# Patient Record
Sex: MALE | Race: BLACK OR AFRICAN AMERICAN | Employment: FULL TIME | URBAN - METROPOLITAN AREA
[De-identification: names, ages, dates, MRNs, and addresses within clinical notes are randomized per-mention and may not be internally consistent; named-entity substitution may affect disease eponyms.]

---

## 2020-01-28 ENCOUNTER — HOSPITAL ENCOUNTER (EMERGENCY)
Age: 45
Discharge: HOME | End: 2020-01-28
Attending: EMERGENCY MEDICINE | Admitting: EMERGENCY MEDICINE
Payer: COMMERCIAL

## 2020-01-28 ENCOUNTER — APPOINTMENT (OUTPATIENT)
Dept: CT IMAGING | Age: 45
End: 2020-01-28
Attending: PHYSICIAN ASSISTANT
Payer: COMMERCIAL

## 2020-01-28 VITALS
RESPIRATION RATE: 17 BRPM | TEMPERATURE: 98.8 F | DIASTOLIC BLOOD PRESSURE: 92 MMHG | SYSTOLIC BLOOD PRESSURE: 144 MMHG | WEIGHT: 230 LBS | HEIGHT: 70 IN | OXYGEN SATURATION: 100 % | BODY MASS INDEX: 32.93 KG/M2 | HEART RATE: 85 BPM

## 2020-01-28 DIAGNOSIS — H44.001 EYE INFECTION, RIGHT: Primary | ICD-10-CM

## 2020-01-28 DIAGNOSIS — H53.9 CHANGE IN VISION: ICD-10-CM

## 2020-01-28 LAB
ALBUMIN SERPL-MCNC: 4.1 G/DL (ref 3.4–5)
ALBUMIN/GLOB SERPL: 1.3 {RATIO} (ref 0.8–1.7)
ALP SERPL-CCNC: 60 U/L (ref 45–117)
ALT SERPL-CCNC: 40 U/L (ref 16–61)
ANION GAP SERPL CALC-SCNC: 4 MMOL/L (ref 3–18)
AST SERPL-CCNC: 30 U/L (ref 10–38)
BASOPHILS # BLD: 0 K/UL (ref 0–0.1)
BASOPHILS NFR BLD: 0 % (ref 0–2)
BILIRUB SERPL-MCNC: 0.5 MG/DL (ref 0.2–1)
BUN SERPL-MCNC: 18 MG/DL (ref 7–18)
BUN/CREAT SERPL: 16 (ref 12–20)
CALCIUM SERPL-MCNC: 9.1 MG/DL (ref 8.5–10.1)
CHLORIDE SERPL-SCNC: 107 MMOL/L (ref 100–111)
CO2 SERPL-SCNC: 29 MMOL/L (ref 21–32)
CREAT SERPL-MCNC: 1.12 MG/DL (ref 0.6–1.3)
DIFFERENTIAL METHOD BLD: ABNORMAL
EOSINOPHIL # BLD: 0.3 K/UL (ref 0–0.4)
EOSINOPHIL NFR BLD: 4 % (ref 0–5)
ERYTHROCYTE [DISTWIDTH] IN BLOOD BY AUTOMATED COUNT: 12.5 % (ref 11.6–14.5)
GLOBULIN SER CALC-MCNC: 3.2 G/DL (ref 2–4)
GLUCOSE SERPL-MCNC: 84 MG/DL (ref 74–99)
HCT VFR BLD AUTO: 41.7 % (ref 36–48)
HGB BLD-MCNC: 14.9 G/DL (ref 13–16)
LYMPHOCYTES # BLD: 3 K/UL (ref 0.9–3.6)
LYMPHOCYTES NFR BLD: 34 % (ref 21–52)
MAGNESIUM SERPL-MCNC: 2.3 MG/DL (ref 1.6–2.6)
MCH RBC QN AUTO: 33 PG (ref 24–34)
MCHC RBC AUTO-ENTMCNC: 35.7 G/DL (ref 31–37)
MCV RBC AUTO: 92.5 FL (ref 74–97)
MONOCYTES # BLD: 0.5 K/UL (ref 0.05–1.2)
MONOCYTES NFR BLD: 6 % (ref 3–10)
NEUTS SEG # BLD: 4.9 K/UL (ref 1.8–8)
NEUTS SEG NFR BLD: 56 % (ref 40–73)
PLATELET # BLD AUTO: 276 K/UL (ref 135–420)
PMV BLD AUTO: 10.1 FL (ref 9.2–11.8)
POTASSIUM SERPL-SCNC: 3.6 MMOL/L (ref 3.5–5.5)
PROT SERPL-MCNC: 7.3 G/DL (ref 6.4–8.2)
RBC # BLD AUTO: 4.51 M/UL (ref 4.7–5.5)
SODIUM SERPL-SCNC: 140 MMOL/L (ref 136–145)
WBC # BLD AUTO: 8.8 K/UL (ref 4.6–13.2)

## 2020-01-28 PROCEDURE — 83735 ASSAY OF MAGNESIUM: CPT

## 2020-01-28 PROCEDURE — 74011000250 HC RX REV CODE- 250: Performed by: PHYSICIAN ASSISTANT

## 2020-01-28 PROCEDURE — 99284 EMERGENCY DEPT VISIT MOD MDM: CPT

## 2020-01-28 PROCEDURE — 70450 CT HEAD/BRAIN W/O DYE: CPT

## 2020-01-28 PROCEDURE — 85025 COMPLETE CBC W/AUTO DIFF WBC: CPT

## 2020-01-28 PROCEDURE — 80053 COMPREHEN METABOLIC PANEL: CPT

## 2020-01-28 RX ORDER — AMLODIPINE BESYLATE 10 MG/1
TABLET ORAL DAILY
COMMUNITY

## 2020-01-28 RX ORDER — LEVOFLOXACIN 5 MG/ML
SOLUTION/ DROPS TOPICAL
Qty: 5 ML | Refills: 0 | Status: SHIPPED | OUTPATIENT
Start: 2020-01-28 | End: 2020-01-28

## 2020-01-28 RX ORDER — LEVOFLOXACIN 5 MG/ML
SOLUTION/ DROPS TOPICAL
Qty: 5 ML | Refills: 0 | Status: SHIPPED | OUTPATIENT
Start: 2020-01-28

## 2020-01-28 RX ORDER — PROPARACAINE HYDROCHLORIDE 5 MG/ML
1 SOLUTION/ DROPS OPHTHALMIC
Status: COMPLETED | OUTPATIENT
Start: 2020-01-28 | End: 2020-01-28

## 2020-01-28 RX ADMIN — FLUORESCEIN SODIUM 1 STRIP: 1 STRIP OPHTHALMIC at 18:52

## 2020-01-28 RX ADMIN — PROPARACAINE HYDROCHLORIDE 1 DROP: 5 SOLUTION/ DROPS OPHTHALMIC at 18:52

## 2020-01-28 NOTE — ED NOTES
Assumed care of pt. Pt is A&OX4. And appears in NAD. Pt is ambulatory. Breathing is spontaneous and unlabored. Equal chest rise/fall. Skin is warm and dry. Pt is on partial monitor. VVS.     Pt c/o blurred vision X 2 days and floaters X 5 days in R eye only. Denies pain. States he can still see but that it is like \"looking thru a screen. \" Denies HA, dizziness or pain. Slight redness noted. Hx of HTN.

## 2020-01-28 NOTE — ED PROVIDER NOTES
EMERGENCY DEPARTMENT HISTORY AND PHYSICAL EXAM    Date: 1/28/2020  Patient Name: Miles López    History of Presenting Illness     Chief Complaint   Patient presents with    Blurred Vision         History Provided By: Patient    Chief Complaint: Blurry vision to R eye   Duration: 5 Days  Timing:  Gradual  Location: R Eye  Quality:Blurry   Severity: Mild  Modifying Factors:   Associated Symptoms: Floaters     HPI: Miles López is a 53 y.o. male with a PMH of hypertension, obesity, deep vein thrombosis and asthma who presents with vision changes for five days. Pt gradually started seeing Floaters to the right eye and then started seeing flash of flight in his peripheral vision five days ago. Two days after, pt started having blurry vision to the R eye, he describes it as \"seeing through a screen\". Pt admits to wearing contacts for weeks at time without removing them, pt sleeps with contacts on a daily basis. Last contact use was 5 days ago. Pt denies double vision, eye D/C, recent eye or sinus infection, eye pain, burning , or itching, HA, light headiness. . Pt also denies any recent surgeries, head injury or foreign body sensation. PT admits to smoking three cigars on a daily basis. PCP: Yeny Aguilar MD    Current Outpatient Medications   Medication Sig Dispense Refill    amLODIPine (NORVASC) 10 mg tablet Take  by mouth daily.  levoFLOXacin (QUIXIN) 0.5 % ophthalmic solution 1-2 drops in right eye every 2 hours while awake for 2 days, THEN every 4-8 hours for 5 days 5 mL 0       Past History     Past Medical History:  Past Medical History:   Diagnosis Date    Asthma     Hypertension        Past Surgical History:  History reviewed. No pertinent surgical history. Family History:  History reviewed. No pertinent family history.     Social History:  Social History     Tobacco Use    Smoking status: Current Every Day Smoker    Smokeless tobacco: Never Used   Substance Use Topics    Alcohol use: Not on file  Drug use: Not on file       Allergies: Allergies   Allergen Reactions    Theophylline Nausea and Vomiting         Review of Systems   Review of Systems   Constitutional: Negative for activity change, chills, fatigue and fever. HENT: Negative. Negative for facial swelling, rhinorrhea, sinus pain and sore throat. Eyes: Positive for visual disturbance. Negative for photophobia, pain, discharge, redness and itching. Respiratory: Negative for cough, shortness of breath and wheezing. Cardiovascular: Negative. Negative for chest pain and palpitations. Gastrointestinal: Negative. Negative for abdominal pain, nausea and vomiting. Endocrine: Negative. Genitourinary: Negative for dysuria. Musculoskeletal: Negative. Skin: Negative. Neurological: Negative for dizziness, syncope, facial asymmetry, speech difficulty, weakness, light-headedness and headaches. Psychiatric/Behavioral: Negative. Negative for sleep disturbance. All other systems reviewed and are negative. Physical Exam     Vitals:    01/28/20 1705 01/28/20 1800 01/28/20 1815   BP: (!) 143/102 (!) 151/103 (!) 153/92   Pulse: 85     Resp: 17     Temp: 98.8 °F (37.1 °C)     SpO2: 100% 100% 100%   Weight: 104.3 kg (230 lb)     Height: 5' 10\" (1.778 m)       Physical Exam  Vitals signs and nursing note reviewed. Constitutional:       General: He is not in acute distress. Appearance: Normal appearance. He is well-developed and well-groomed. He is not ill-appearing. HENT:      Head: Normocephalic and atraumatic. Nose: Nose normal.      Mouth/Throat:      Mouth: Mucous membranes are moist.   Eyes:      General: Lids are normal. Lids are everted, no foreign bodies appreciated. No visual field deficit or scleral icterus. Right eye: No foreign body, discharge or hordeolum. Left eye: No foreign body, discharge or hordeolum. Extraocular Movements: Extraocular movements intact.       Conjunctiva/sclera: Right eye: Right conjunctiva is injected. No chemosis, exudate or hemorrhage. Left eye: Left conjunctiva is not injected. No chemosis, exudate or hemorrhage. Pupils: Pupils are equal, round, and reactive to light. Right eye: No corneal abrasion or fluorescein uptake. Slit lamp exam:     Right eye: No corneal flare, corneal ulcer, foreign body, hyphema, anterior chamber bulge or photophobia. Neck:      Musculoskeletal: Normal range of motion and neck supple. Vascular: No JVD. Trachea: No tracheal deviation. Cardiovascular:      Rate and Rhythm: Normal rate and regular rhythm. Pulses: Normal pulses. Heart sounds: Normal heart sounds, S1 normal and S2 normal. No murmur. Pulmonary:      Effort: Pulmonary effort is normal. No respiratory distress. Breath sounds: Normal breath sounds and air entry. No stridor. No wheezing, rhonchi or rales. Abdominal:      Palpations: Abdomen is soft. Tenderness: There is no abdominal tenderness. Musculoskeletal:         General: No deformity or signs of injury. Skin:     General: Skin is warm and dry. Capillary Refill: Capillary refill takes less than 2 seconds. Neurological:      Mental Status: He is alert and oriented to person, place, and time. GCS: GCS eye subscore is 4. GCS verbal subscore is 5. GCS motor subscore is 6. Cranial Nerves: Cranial nerves are intact. Sensory: Sensation is intact. Gait: Gait normal.   Psychiatric:         Attention and Perception: Attention and perception normal.         Mood and Affect: Mood normal.         Speech: Speech normal.         Behavior: Behavior is cooperative. Thought Content:  Thought content normal.         Judgment: Judgment normal.           Diagnostic Study Results     Labs -     Recent Results (from the past 12 hour(s))   CBC WITH AUTOMATED DIFF    Collection Time: 01/28/20  5:15 PM   Result Value Ref Range    WBC 8.8 4.6 - 13.2 K/uL    RBC 4.51 (L) 4.70 - 5.50 M/uL    HGB 14.9 13.0 - 16.0 g/dL    HCT 41.7 36.0 - 48.0 %    MCV 92.5 74.0 - 97.0 FL    MCH 33.0 24.0 - 34.0 PG    MCHC 35.7 31.0 - 37.0 g/dL    RDW 12.5 11.6 - 14.5 %    PLATELET 256 780 - 458 K/uL    MPV 10.1 9.2 - 11.8 FL    NEUTROPHILS 56 40 - 73 %    LYMPHOCYTES 34 21 - 52 %    MONOCYTES 6 3 - 10 %    EOSINOPHILS 4 0 - 5 %    BASOPHILS 0 0 - 2 %    ABS. NEUTROPHILS 4.9 1.8 - 8.0 K/UL    ABS. LYMPHOCYTES 3.0 0.9 - 3.6 K/UL    ABS. MONOCYTES 0.5 0.05 - 1.2 K/UL    ABS. EOSINOPHILS 0.3 0.0 - 0.4 K/UL    ABS. BASOPHILS 0.0 0.0 - 0.1 K/UL    DF AUTOMATED     METABOLIC PANEL, COMPREHENSIVE    Collection Time: 01/28/20  5:15 PM   Result Value Ref Range    Sodium 140 136 - 145 mmol/L    Potassium 3.6 3.5 - 5.5 mmol/L    Chloride 107 100 - 111 mmol/L    CO2 29 21 - 32 mmol/L    Anion gap 4 3.0 - 18 mmol/L    Glucose 84 74 - 99 mg/dL    BUN 18 7.0 - 18 MG/DL    Creatinine 1.12 0.6 - 1.3 MG/DL    BUN/Creatinine ratio 16 12 - 20      GFR est AA >60 >60 ml/min/1.73m2    GFR est non-AA >60 >60 ml/min/1.73m2    Calcium 9.1 8.5 - 10.1 MG/DL    Bilirubin, total 0.5 0.2 - 1.0 MG/DL    ALT (SGPT) 40 16 - 61 U/L    AST (SGOT) 30 10 - 38 U/L    Alk. phosphatase 60 45 - 117 U/L    Protein, total 7.3 6.4 - 8.2 g/dL    Albumin 4.1 3.4 - 5.0 g/dL    Globulin 3.2 2.0 - 4.0 g/dL    A-G Ratio 1.3 0.8 - 1.7     MAGNESIUM    Collection Time: 01/28/20  5:15 PM   Result Value Ref Range    Magnesium 2.3 1.6 - 2.6 mg/dL       Radiologic Studies -   CT HEAD WO CONT   Final Result   IMPRESSION:      1. No acute intracranial pathology. CT Results  (Last 48 hours)               01/28/20 1733  CT HEAD WO CONT Final result    Impression:  IMPRESSION:       1. No acute intracranial pathology. Narrative:  EXAMINATION:  CT head noncontrast       COMPARISON: None       HISTORY: Vision changes, blurry vision.        TECHNIQUE: Noncontrasted axial images were obtained through the patient's brain   from the vertex of the skull through the skull base. Bone and soft tissue   windows were reviewed. One or more dose reduction techniques were used on this   CT: automated exposure control, adjustment of the mAs and/or kVp according to   patient size, and iterative reconstruction techniques. The specific techniques   used on this CT exam have been documented in the patient's electronic medical   record. Digital Imaging and Communications in Medicine (DICOM) format image   data are available to nonaffiliated external healthcare facilities or entities   on a secure, media free, reciprocally searchable basis with patient   authorization for at least a 12-month period after this study. Lilian Lowery FINDINGS: Brain architecture is normal. No mass effect, midline shift or   hemorrhage. Ventricles are normal in size, position and configuration. No   abnormal extra-axial fluid collections are seen. No territorial signs of   infarct. The bony calvarium appears intact without acute displaced fracture. The   visualized paranasal sinuses and mastoid air cells are aerated. CXR Results  (Last 48 hours)    None            Medical Decision Making   I am the first provider for this patient. I reviewed the vital signs, available nursing notes, past medical history, past surgical history, family history and social history. Vital Signs-Reviewed the patient's vital signs. Records Reviewed: Nursing Notes and Old Medical Records     7:30 PM  52 y/o female who presents to the ER c/o gradual change in his vision of his R eye. Initially starting 5 days prior. Worsening over past 2 days. Pt admits to wearing contacts more frequently then he should; poor contact hygiene. Last eye exam was 1 year prior. PE reassuring on exam w/ normal visual acuity. Complaining of occasional floaters in R eye. No recent falls or head trauma. Does not work around [employer] or dust.  Advise to avoid wearing contacts until follow up w/ ophthalmology.   Discussed strict return precautions. Will cover w/ levofloxacin. Discussed all results w/ pt. Ct head negative. Labs stable w/ no acute process. All questions answered and patient in agreement with plan of care. Will plan for discharge. Graciela Hensley PA-C       Disposition:  discharged    DISCHARGE NOTE:       Care plan outlined and precautions discussed. Patient has no new complaints, changes, or physical findings. Results of labs, imaging were reviewed with the patient. All medications were reviewed with the patient; will d/c home with levofloxacin. All of pt's questions and concerns were addressed. Patient was instructed and agrees to follow up with ophthalmology, as well as to return to the ED upon further deterioration. Patient is ready to go home. Follow-up Information     Follow up With Specialties Details Why 500 [facility] EMERGENCY DEPT Emergency Medicine  If symptoms worsen [address] [place] [address] 51    Darcy Michelle MD Ophthalmology Schedule an appointment as soon as possible for a visit in 1 day Make an appt w/ ophthalmology as we discussed; do not wear contacts until further evaluated as we discussed [address] [place] [address] 54 [facility]  [phone]            Current Discharge Medication List      START taking these medications    Details   levoFLOXacin (QUIXIN) 0.5 % ophthalmic solution 1-2 drops in right eye every 2 hours while awake for 2 days, THEN every 4-8 hours for 5 days  Qty: 5 mL, Refills: 0         CONTINUE these medications which have NOT CHANGED    Details   amLODIPine (NORVASC) 10 mg tablet Take  by mouth daily. Procedures:  Eye Stain    Date/Time: 1/28/2020 7:30 PM    Performed by: PA        Corneal abrasion was not present on eyelid eversion. Cornea is clear. Anterior chamber is clear.     Patient tolerance: Patient tolerated the procedure well with no immediate complications  My total time at bedside, performing this procedure was 1-15 minutes. Diagnosis     Clinical Impression:   1. Eye infection, right    2.  Change in vision

## 2020-01-28 NOTE — ED TRIAGE NOTES
2 days of right eye blurry. Falls asleep with contacts in. Visual acuity 20/20. Says sees wavey line and floaters.